# Patient Record
(demographics unavailable — no encounter records)

---

## 2024-10-15 NOTE — HISTORY OF PRESENT ILLNESS
[FreeTextEntry1] : Ms. 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 Patient was diagnosed with a history of mitral valve prolapse in her teen years. Presently denies chest pain, shortness of breath, dizziness, lightheadedness, palpitations or near syncope or syncope, orthopnea, PND and increasing lower extremity edema.    General CVD risk enhancers:  FH premature CAD? MVP on Mom's side, CHF paternal GF Migraines- Y  Female-specific CV-focused past medical history:  Menstrual irregularity- N PCOS- N Infertility - N   1 Para 0   Stillbirth -0  Miscarriages- 0   Gestational diabetes- 0  Hypertensive disorders of pregnancy- 0   delivery- o , Gestational age - 0  Low birth weight or small for gestational age - , how small? 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 No evidence of LVH.

## 2024-10-15 NOTE — DISCUSSION/SUMMARY
[FreeTextEntry1] : In summary, Ms. 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 Remains asymptomatic.   # MItral valve prolapse: Echocardiogram to assess the structural and valvular function.  # BP Stable Encouraged Patient to monitor BP at home, keep a log, and report results back to us for evaluation. Based on the results, we will adjust medications as necessary. Additionally, encouraged a heart-healthy diet and exercise as tolerated. EKG with no acute changes.    [EKG obtained to assist in diagnosis and management of assessed problem(s)] : EKG obtained to assist in diagnosis and management of assessed problem(s)